# Patient Record
Sex: MALE | Race: WHITE | NOT HISPANIC OR LATINO
[De-identification: names, ages, dates, MRNs, and addresses within clinical notes are randomized per-mention and may not be internally consistent; named-entity substitution may affect disease eponyms.]

---

## 2017-02-01 ENCOUNTER — APPOINTMENT (OUTPATIENT)
Dept: BARIATRICS | Facility: CLINIC | Age: 47
End: 2017-02-01

## 2017-02-01 VITALS
BODY MASS INDEX: 32.75 KG/M2 | WEIGHT: 233.91 LBS | SYSTOLIC BLOOD PRESSURE: 123 MMHG | HEIGHT: 71 IN | HEART RATE: 75 BPM | DIASTOLIC BLOOD PRESSURE: 75 MMHG

## 2017-02-01 DIAGNOSIS — E66.9 OBESITY, UNSPECIFIED: ICD-10-CM

## 2017-09-07 ENCOUNTER — APPOINTMENT (OUTPATIENT)
Dept: BARIATRICS | Facility: CLINIC | Age: 47
End: 2017-09-07
Payer: COMMERCIAL

## 2017-09-07 ENCOUNTER — OUTPATIENT (OUTPATIENT)
Dept: OUTPATIENT SERVICES | Facility: HOSPITAL | Age: 47
LOS: 1 days | End: 2017-09-07
Payer: COMMERCIAL

## 2017-09-07 VITALS
WEIGHT: 231 LBS | DIASTOLIC BLOOD PRESSURE: 89 MMHG | HEART RATE: 74 BPM | SYSTOLIC BLOOD PRESSURE: 135 MMHG | HEIGHT: 71 IN | BODY MASS INDEX: 32.34 KG/M2

## 2017-09-07 DIAGNOSIS — Z98.89 OTHER SPECIFIED POSTPROCEDURAL STATES: Chronic | ICD-10-CM

## 2017-09-07 DIAGNOSIS — K08.409 PARTIAL LOSS OF TEETH, UNSPECIFIED CAUSE, UNSPECIFIED CLASS: Chronic | ICD-10-CM

## 2017-09-07 DIAGNOSIS — S62.509A FRACTURE OF UNSPECIFIED PHALANX OF UNSPECIFIED THUMB, INITIAL ENCOUNTER FOR CLOSED FRACTURE: Chronic | ICD-10-CM

## 2017-09-07 DIAGNOSIS — Z98.84 BARIATRIC SURGERY STATUS: ICD-10-CM

## 2017-09-07 PROCEDURE — 99214 OFFICE O/P EST MOD 30 MIN: CPT

## 2017-09-07 PROCEDURE — 74220 X-RAY XM ESOPHAGUS 1CNTRST: CPT

## 2017-09-07 PROCEDURE — 74220 X-RAY XM ESOPHAGUS 1CNTRST: CPT | Mod: 26

## 2018-06-07 ENCOUNTER — APPOINTMENT (OUTPATIENT)
Dept: BARIATRICS | Facility: CLINIC | Age: 48
End: 2018-06-07
Payer: COMMERCIAL

## 2018-06-07 VITALS
HEIGHT: 71 IN | SYSTOLIC BLOOD PRESSURE: 120 MMHG | DIASTOLIC BLOOD PRESSURE: 80 MMHG | OXYGEN SATURATION: 98 % | WEIGHT: 240.3 LBS | BODY MASS INDEX: 33.64 KG/M2 | HEART RATE: 77 BPM

## 2018-06-07 DIAGNOSIS — E78.5 HYPERLIPIDEMIA, UNSPECIFIED: ICD-10-CM

## 2018-06-07 PROCEDURE — S2083 ADJUSTMENT GASTRIC BAND: CPT

## 2018-06-07 PROCEDURE — 99214 OFFICE O/P EST MOD 30 MIN: CPT | Mod: 25

## 2018-06-07 RX ORDER — KETOCONAZOLE 20 MG/G
2 CREAM TOPICAL
Qty: 30 | Refills: 0 | Status: COMPLETED | COMMUNITY
Start: 2018-03-12

## 2018-06-07 RX ORDER — TOBRAMYCIN 3 MG/ML
0.3 SOLUTION/ DROPS OPHTHALMIC
Qty: 5 | Refills: 0 | Status: COMPLETED | COMMUNITY
Start: 2018-04-19

## 2018-08-21 ENCOUNTER — APPOINTMENT (OUTPATIENT)
Dept: CARDIOLOGY | Facility: CLINIC | Age: 48
End: 2018-08-21

## 2018-08-21 ENCOUNTER — OUTPATIENT (OUTPATIENT)
Dept: OUTPATIENT SERVICES | Facility: HOSPITAL | Age: 48
LOS: 1 days | End: 2018-08-21
Payer: COMMERCIAL

## 2018-08-21 DIAGNOSIS — R07.89 OTHER CHEST PAIN: ICD-10-CM

## 2018-08-21 DIAGNOSIS — S62.509A FRACTURE OF UNSPECIFIED PHALANX OF UNSPECIFIED THUMB, INITIAL ENCOUNTER FOR CLOSED FRACTURE: Chronic | ICD-10-CM

## 2018-08-21 DIAGNOSIS — K08.409 PARTIAL LOSS OF TEETH, UNSPECIFIED CAUSE, UNSPECIFIED CLASS: Chronic | ICD-10-CM

## 2018-08-21 DIAGNOSIS — Z98.89 OTHER SPECIFIED POSTPROCEDURAL STATES: Chronic | ICD-10-CM

## 2018-08-21 DIAGNOSIS — R07.9 CHEST PAIN, UNSPECIFIED: ICD-10-CM

## 2018-08-21 PROCEDURE — 75574 CT ANGIO HRT W/3D IMAGE: CPT

## 2018-08-21 PROCEDURE — 75574 CT ANGIO HRT W/3D IMAGE: CPT | Mod: 26

## 2018-08-30 NOTE — H&P ADULT - ASSESSMENT
This is a 48 y.o. male with strong family history of MI and PMHx of HTN, HLD, pre-diabetic who c/o chest pain/ discomfort and dizziness was found with mild LAD and RCA disease on CT coronary angiogram. Now, pt is referred to cardiac cath with possible intervention. This is a 48 y.o. male with strong family history of MI and PMHx of HTN, HLD, pre-diabetic who c/o chest pain/ discomfort and dizziness was found with mild LAD and RCA disease on CT coronary angiogram. Now, pt is referred to cardiac cath with possible intervention.   [facility] risks, benefits and alternatives discussed with patient. Risk discussed included, but not limited to MI, stroke, mortality, major bleeding, arrythmia, or infection. Consent obtained and signed educational material provided. Pt. verbalizes and understands pre-procedural instructions.

## 2018-08-30 NOTE — ASU PATIENT PROFILE, ADULT - PMH
Heel spur, left  received 2 cortisone shots 10/13  Hypertension  treated in past  Sleep apnea, obstructive  moderate

## 2018-08-30 NOTE — ASU PATIENT PROFILE, ADULT - PSH
S/P foot surgery  benign tumors removed. 1988  S/P hernia repair  right inguinal 2008, umbilical 1975  S/P tonsillectomy  1975  S/P tooth extraction  wisdom teeth  Thumb fracture  right thumb surgery due to accident,1996

## 2018-08-30 NOTE — H&P ADULT - PROBLEM SELECTOR PLAN 1
- plan for cardiac cath with possible intervention.   - risks and benefits of procedure were discussed with patient, informed consent obtained Pt verbalized understanding.

## 2018-08-30 NOTE — H&P ADULT - FAMILY HISTORY
Father  Still living? No  Family history of MI (myocardial infarction), Age at diagnosis: 51-60  Family history of hypertension, Age at diagnosis: Age Unknown     Grandparent  Still living? No  Family history of MI (myocardial infarction), Age at diagnosis: 31-40     Sibling  Still living? Unknown  Family history of obesity, Age at diagnosis: Age Unknown

## 2018-08-30 NOTE — H&P ADULT - NSHPREVIEWOFSYSTEMS_GEN_ALL_CORE
CONSTITUTIONAL: No fever, weight loss, or fatigue  EYES: No eye pain, visual disturbances, or discharge  ENMT:  No difficulty hearing, tinnitus, vertigo; No sinus or throat pain  NECK: No pain or stiffness  BREASTS: No pain, masses, or nipple discharge  RESPIRATORY: +shortness of breath  CARDIOVASCULAR: +chest pain  GASTROINTESTINAL: No abdominal or epigastric pain. No nausea, vomiting, or hematemesis; No diarrhea or constipation. No melena or hematochezia.  GENITOURINARY: No dysuria, frequency, hematuria, or incontinence  NEUROLOGICAL: No headaches, memory loss, loss of strength, numbness, or tremors  SKIN: No itching, burning, rashes, or lesions   LYMPH NODES: No enlarged glands  ENDOCRINE: No heat or cold intolerance; No hair loss  MUSCULOSKELETAL: No joint pain or swelling; No muscle, back, or extremity pain  PSYCHIATRIC: No depression, anxiety, mood swings, or difficulty sleeping  HEME/LYMPH: No easy bruising, or bleeding gums  ALLERGY AND IMMUNOLOGIC: No hives or eczema

## 2018-08-30 NOTE — H&P ADULT - HISTORY OF PRESENT ILLNESS
This is a 48 y.o. male with strong family history of MI and PMHx of HTN, HLD, pre-diabetic who c/o chest pain/ discomfort and dizziness was found with mild LAD and RCA disease on CT coronary angiogram. Now, pt is referred to cardiac cath with possible intervention.

## 2018-08-31 ENCOUNTER — OUTPATIENT (OUTPATIENT)
Dept: OUTPATIENT SERVICES | Facility: HOSPITAL | Age: 48
LOS: 1 days | Discharge: HOME | End: 2018-08-31

## 2018-08-31 VITALS
SYSTOLIC BLOOD PRESSURE: 124 MMHG | OXYGEN SATURATION: 98 % | HEART RATE: 70 BPM | RESPIRATION RATE: 14 BRPM | DIASTOLIC BLOOD PRESSURE: 72 MMHG

## 2018-08-31 VITALS
OXYGEN SATURATION: 99 % | RESPIRATION RATE: 15 BRPM | DIASTOLIC BLOOD PRESSURE: 90 MMHG | TEMPERATURE: 97 F | HEART RATE: 80 BPM | HEIGHT: 72 IN | SYSTOLIC BLOOD PRESSURE: 126 MMHG | WEIGHT: 235.01 LBS

## 2018-08-31 DIAGNOSIS — Z98.89 OTHER SPECIFIED POSTPROCEDURAL STATES: Chronic | ICD-10-CM

## 2018-08-31 DIAGNOSIS — R93.1 ABNORMAL FINDINGS ON DIAGNOSTIC IMAGING OF HEART AND CORONARY CIRCULATION: ICD-10-CM

## 2018-08-31 DIAGNOSIS — S62.509A FRACTURE OF UNSPECIFIED PHALANX OF UNSPECIFIED THUMB, INITIAL ENCOUNTER FOR CLOSED FRACTURE: Chronic | ICD-10-CM

## 2018-08-31 DIAGNOSIS — K08.409 PARTIAL LOSS OF TEETH, UNSPECIFIED CAUSE, UNSPECIFIED CLASS: Chronic | ICD-10-CM

## 2018-08-31 NOTE — PROGRESS NOTE ADULT - SUBJECTIVE AND OBJECTIVE BOX
s/p LHC revealing non obstructive CAD    Patient feels well.  Denies chest pain, shortness of breath, dizziness or palpitations at this time  A+Ox3  CV:   Respiratory:   Right groin procedure sheath pulled;  site CDI.  no bleeding, no hematoma, site soft, non tender, positive pedal pulses bilaterally      HPI:  This is a 48 y.o. male with strong family history of MI and PMHx of HTN, HLD, pre-diabetic who c/o chest pain/ discomfort and dizziness was found with mild LAD and RCA disease on CT coronary angiogram. Now, pt is referred to cardiac cath with possible intervention. (30 Aug 2018 16:03)    now s/p LHC < from: Cardiac Cath Lab - Adult (08.31.18 @ 08:06) >  Mild but non-obstructive disease in the small caliber D1 with otherwise   unremarkable LAD, Circ, Ramus and RCA arteries.  Normal LV systolic function. Estimated LV ejection fraction is 60-65 %.      < end of copied text >      --vital signs, labs, diet and activity per post procedure orders  -ambulate ad jason post bedrest  -encourage PO fluids  -plan of care discussed with patient, family and MD  -d/c after bedrest if stable  -post procedure and d/c instructions reviewed  -follow up with MD in 1-2 weeks  -Discussed therapeutic lifestyle changes to reduce risk factors such as following a cardiac diet, weight loss, maintaining a healthy weight, exercise, smoking cessation, medication compliance, and regular follow-up  with MD s/p LHC revealing non obstructive CAD    Patient feels well.  Denies chest pain, shortness of breath, dizziness or palpitations at this time  A+Ox3  CV: S1S2 reg  Respiratory: CTAB, normal effort  Right groin procedure sheath pulled;  site CDI.  no bleeding, no hematoma, site soft, non tender, positive pedal pulses bilaterally      HPI:  This is a 48 y.o. male with strong family history of MI and PMHx of HTN, HLD, pre-diabetic who c/o chest pain/ discomfort and dizziness was found with mild LAD and RCA disease on CT coronary angiogram. Now, pt is referred to cardiac cath with possible intervention. (30 Aug 2018 16:03)    now s/p LHC < from: Cardiac Cath Lab - Adult (08.31.18 @ 08:06) >  Mild but non-obstructive disease in the small caliber D1 with otherwise   unremarkable LAD, Circ, Ramus and RCA arteries.  Normal LV systolic function. Estimated LV ejection fraction is 60-65 %.      < end of copied text >      --vital signs, labs, diet and activity per post procedure orders  -ambulate ad jason post bedrest  -encourage PO fluids  -plan of care discussed with patient, family and MD  -d/c after bedrest if stable  -post procedure and d/c instructions reviewed  -follow up with MD in 1-2 weeks  -Discussed therapeutic lifestyle changes to reduce risk factors such as following a cardiac diet, weight loss, maintaining a healthy weight, exercise, smoking cessation, medication compliance, and regular follow-up  with MD

## 2018-08-31 NOTE — PACU DISCHARGE NOTE - COMMENTS
VSS, pt rt groin site D+I benign, +2pp noted, pt tolerates  oob ambulation, voiding, understands/explained all d/c instructions & need for f/u care

## 2018-09-04 DIAGNOSIS — G47.33 OBSTRUCTIVE SLEEP APNEA (ADULT) (PEDIATRIC): ICD-10-CM

## 2018-09-04 DIAGNOSIS — I20.9 ANGINA PECTORIS, UNSPECIFIED: ICD-10-CM

## 2018-09-04 DIAGNOSIS — Z88.0 ALLERGY STATUS TO PENICILLIN: ICD-10-CM

## 2018-09-04 DIAGNOSIS — I10 ESSENTIAL (PRIMARY) HYPERTENSION: ICD-10-CM

## 2018-09-04 DIAGNOSIS — Z82.49 FAMILY HISTORY OF ISCHEMIC HEART DISEASE AND OTHER DISEASES OF THE CIRCULATORY SYSTEM: ICD-10-CM

## 2018-09-04 DIAGNOSIS — E78.00 PURE HYPERCHOLESTEROLEMIA, UNSPECIFIED: ICD-10-CM

## 2018-09-04 DIAGNOSIS — E11.9 TYPE 2 DIABETES MELLITUS WITHOUT COMPLICATIONS: ICD-10-CM

## 2018-09-04 DIAGNOSIS — I25.119 ATHEROSCLEROTIC HEART DISEASE OF NATIVE CORONARY ARTERY WITH UNSPECIFIED ANGINA PECTORIS: ICD-10-CM

## 2018-12-03 NOTE — ASU PREOP CHECKLIST - NS PREOP CHK MONITOR ANESTHESIA CONSENT
Telephone Encounter by Aurora Price at 10/23/18 07:43 AM     Author:  Aurora Price Service:  (none) Author Type:  Patient      Filed:  10/23/18 07:43 AM Encounter Date:  10/22/2018 Status:  Signed     :  Aurora Price (Patient )            Appt scheduled on 11/15.  Msg stated 10/15. Apologized to pt for error.[KL1.1M]      Revision History        User Key Date/Time User Provider Type Action    > KL1.1 10/23/18 07:43 AM Aurora Price Patient  Sign    M - Manual             done

## 2019-02-06 ENCOUNTER — OUTPATIENT (OUTPATIENT)
Dept: OUTPATIENT SERVICES | Facility: HOSPITAL | Age: 49
LOS: 1 days | End: 2019-02-06
Payer: COMMERCIAL

## 2019-02-06 ENCOUNTER — APPOINTMENT (OUTPATIENT)
Dept: BARIATRICS | Facility: CLINIC | Age: 49
End: 2019-02-06
Payer: COMMERCIAL

## 2019-02-06 VITALS
BODY MASS INDEX: 33.33 KG/M2 | OXYGEN SATURATION: 97 % | WEIGHT: 238.1 LBS | SYSTOLIC BLOOD PRESSURE: 120 MMHG | HEIGHT: 71 IN | DIASTOLIC BLOOD PRESSURE: 80 MMHG | HEART RATE: 80 BPM

## 2019-02-06 DIAGNOSIS — E78.5 HYPERLIPIDEMIA, UNSPECIFIED: ICD-10-CM

## 2019-02-06 DIAGNOSIS — E66.9 OBESITY, UNSPECIFIED: ICD-10-CM

## 2019-02-06 DIAGNOSIS — Z98.89 OTHER SPECIFIED POSTPROCEDURAL STATES: Chronic | ICD-10-CM

## 2019-02-06 DIAGNOSIS — S62.509A FRACTURE OF UNSPECIFIED PHALANX OF UNSPECIFIED THUMB, INITIAL ENCOUNTER FOR CLOSED FRACTURE: Chronic | ICD-10-CM

## 2019-02-06 DIAGNOSIS — K08.409 PARTIAL LOSS OF TEETH, UNSPECIFIED CAUSE, UNSPECIFIED CLASS: Chronic | ICD-10-CM

## 2019-02-06 DIAGNOSIS — Z98.84 BARIATRIC SURGERY STATUS: ICD-10-CM

## 2019-02-06 PROCEDURE — 99213 OFFICE O/P EST LOW 20 MIN: CPT | Mod: 25

## 2019-02-06 PROCEDURE — 74220 X-RAY XM ESOPHAGUS 1CNTRST: CPT | Mod: 26

## 2019-02-06 PROCEDURE — S2083 ADJUSTMENT GASTRIC BAND: CPT

## 2019-02-06 PROCEDURE — 74220 X-RAY XM ESOPHAGUS 1CNTRST: CPT

## 2019-02-06 NOTE — CONSULT LETTER
[Dear  ___] : Dear  [unfilled], [Courtesy Letter:] : I had the pleasure of seeing your patient, [unfilled], in my office today. [Please see my note below.] : Please see my note below. [Consult Closing:] : Thank you very much for allowing me to participate in the care of this patient.  If you have any questions, please do not hesitate to contact me. [Sincerely,] : Sincerely, [FreeTextEntry3] : Ronaldo Campso MD, FACS, FASMBS\par Chair, Dept of Surgery, [facility]\par [facility]\par [facility]\par [facility]\par [address]\[place] [address]\par P [phone]\par F [phone]\par \par

## 2019-02-06 NOTE — DATA REVIEWED
[FreeTextEntry1] : Video esophagram reviewed demonstrating mild dilation of the distal esophagus and gastric pouch. The lap band appears to be in appropriate orientation without evidence of prolapse the aperture of which is quite narrowed. There is moderate reflux seen during swallows.

## 2019-02-06 NOTE — REASON FOR VISIT
[Follow-Up Visit] : a follow-up visit for [Morbid Obesity (BMI<40)] : morbid obesity (bmi<40) [S/P Bariatric Surgery] : s/p bariatric surgery [Band Adjustment] : band adjustment

## 2019-02-06 NOTE — PHYSICAL EXAM
[Normal] : affect appropriate [de-identified] : Soft, nontender, non distended.  Positive bowel sounds.  No evidence of hernia or masses.  Lap Band port easily palpable without tenderness

## 2019-02-06 NOTE — HISTORY OF PRESENT ILLNESS
[de-identified] : hx Lap Band + Plication.  Returns today with frequent nocturnal reflux and occasional need to force himself to vomit after initiating a meal. [Procedure: ___] : Procedure performed: [unfilled]  [Date of Surgery: ___] : Date of Surgery:   [unfilled] [Surgeon Name:   ___] : Surgeon Name: Dr. DEE

## 2019-02-06 NOTE — PROCEDURE
[FreeTextEntry1] : LAP-BAND adjustment was performed today. Using standard sterile technique, a 20-gauge Ma needle was inserted into the LAP-BAND port.  This was accessed with single puncture.  5 cc were aspirated and -1 cc removed. The resulting net volume remains 4 cc.\par \par The patient was seated erect and tolerated water test without symptoms of reflux, dysphagia or regurgitation.\par \par Routine post adjustment nutritional counseling was provided. This patient should adhere to a liquid diet for the next 24-48 hours advancing one stage per day to regular as tolerated.

## 2019-08-08 ENCOUNTER — APPOINTMENT (OUTPATIENT)
Dept: BARIATRICS | Facility: CLINIC | Age: 49
End: 2019-08-08

## 2019-10-02 ENCOUNTER — APPOINTMENT (OUTPATIENT)
Dept: BARIATRICS | Facility: CLINIC | Age: 49
End: 2019-10-02
Payer: COMMERCIAL

## 2019-10-02 VITALS
WEIGHT: 247.57 LBS | DIASTOLIC BLOOD PRESSURE: 79 MMHG | OXYGEN SATURATION: 96 % | SYSTOLIC BLOOD PRESSURE: 125 MMHG | HEART RATE: 77 BPM | HEIGHT: 71 IN | BODY MASS INDEX: 34.66 KG/M2

## 2019-10-02 DIAGNOSIS — Z87.19 PERSONAL HISTORY OF OTHER DISEASES OF THE DIGESTIVE SYSTEM: ICD-10-CM

## 2019-10-02 DIAGNOSIS — Z98.84 BARIATRIC SURGERY STATUS: ICD-10-CM

## 2019-10-02 DIAGNOSIS — E66.9 OBESITY, UNSPECIFIED: ICD-10-CM

## 2019-10-02 DIAGNOSIS — R63.5 ABNORMAL WEIGHT GAIN: ICD-10-CM

## 2019-10-02 PROCEDURE — 99213 OFFICE O/P EST LOW 20 MIN: CPT

## 2019-10-02 NOTE — PHYSICAL EXAM
[Normal] : affect appropriate [de-identified] : Soft, nontender, non distended.  Positive bowel sounds.  No evidence of hernia or masses.  Lap Band port easily palpable without tenderness

## 2019-10-02 NOTE — HISTORY OF PRESENT ILLNESS
[de-identified] : ROutine Lap band follow up.  Scheduled to undergo [place] surgery in upcoming weeks at [facility].\par NO complaints with respect to his band.  Feels adequate restriction but is eating too quickly and occasionally brings back up food. [Procedure: ___] : Procedure performed: [unfilled]  [Date of Surgery: ___] : Date of Surgery:   [unfilled] [Surgeon Name:   ___] : Surgeon Name: Dr. DEE

## 2020-03-04 ENCOUNTER — APPOINTMENT (OUTPATIENT)
Dept: BARIATRICS | Facility: CLINIC | Age: 50
End: 2020-03-04

## 2021-02-25 ENCOUNTER — OUTPATIENT (OUTPATIENT)
Dept: OUTPATIENT SERVICES | Facility: HOSPITAL | Age: 51
LOS: 1 days | End: 2021-02-25
Payer: COMMERCIAL

## 2021-02-25 ENCOUNTER — APPOINTMENT (OUTPATIENT)
Dept: BARIATRICS | Facility: CLINIC | Age: 51
End: 2021-02-25
Payer: COMMERCIAL

## 2021-02-25 ENCOUNTER — RESULT REVIEW (OUTPATIENT)
Age: 51
End: 2021-02-25

## 2021-02-25 VITALS
WEIGHT: 255.73 LBS | TEMPERATURE: 97.3 F | HEIGHT: 71 IN | BODY MASS INDEX: 35.8 KG/M2 | HEART RATE: 78 BPM | SYSTOLIC BLOOD PRESSURE: 143 MMHG | DIASTOLIC BLOOD PRESSURE: 82 MMHG | OXYGEN SATURATION: 96 %

## 2021-02-25 DIAGNOSIS — Z98.89 OTHER SPECIFIED POSTPROCEDURAL STATES: Chronic | ICD-10-CM

## 2021-02-25 DIAGNOSIS — S62.509A FRACTURE OF UNSPECIFIED PHALANX OF UNSPECIFIED THUMB, INITIAL ENCOUNTER FOR CLOSED FRACTURE: Chronic | ICD-10-CM

## 2021-02-25 DIAGNOSIS — K08.409 PARTIAL LOSS OF TEETH, UNSPECIFIED CAUSE, UNSPECIFIED CLASS: Chronic | ICD-10-CM

## 2021-02-25 DIAGNOSIS — Z98.84 BARIATRIC SURGERY STATUS: ICD-10-CM

## 2021-02-25 PROCEDURE — 99072 ADDL SUPL MATRL&STAF TM PHE: CPT

## 2021-02-25 PROCEDURE — 99213 OFFICE O/P EST LOW 20 MIN: CPT

## 2021-02-25 PROCEDURE — 74220 X-RAY XM ESOPHAGUS 1CNTRST: CPT

## 2021-02-25 PROCEDURE — 74220 X-RAY XM ESOPHAGUS 1CNTRST: CPT | Mod: 26

## 2021-02-25 NOTE — HISTORY OF PRESENT ILLNESS
[Procedure: ___] : Procedure performed: [unfilled]  [Date of Surgery: ___] : Date of Surgery:   [unfilled] [Surgeon Name:   ___] : Surgeon Name: Dr. DEE

## 2021-02-27 NOTE — ASSESSMENT
[FreeTextEntry1] : F/u Band visit.  Last seen in OCt 2019.  Gained 8 lbs.\par Symptoms only when eating too fast.  Has fallen back into old eating habits, lack of portion control, louise.\par Has begun "juicing" (primarily vegetables, "green" drink)\par \par VE personally reviewed today.  Findings discussed with the patient.  No evidence of prolapse or obstruction.  \par \par Re-educated regarding band mechanics and dietary strategy.\par Nutritional counseling has been provided. The patient is encouraged to remain calorie conscious and continue a low fat, low carbohydrate, high protein diet. Also, emphasis has been placed on the importance of adequate hydration, multi-vitamin supplementation and exercise.  (15 min)\par Encouraged to maintain food diary and consider Bariatric Nutritionist follow up for tailoring of diet.\par \par f/u 6 months.

## 2021-02-27 NOTE — PHYSICAL EXAM
[Normal] : affect appropriate [de-identified] : Soft, nontender, non distended.  Positive bowel sounds.  No evidence of hernia or masses.  Lap Band port easily palpable without tenderness

## 2021-09-30 ENCOUNTER — APPOINTMENT (OUTPATIENT)
Dept: BARIATRICS | Facility: CLINIC | Age: 51
End: 2021-09-30

## 2021-10-26 ENCOUNTER — EMERGENCY (EMERGENCY)
Facility: HOSPITAL | Age: 51
LOS: 0 days | Discharge: HOME | End: 2021-10-26
Attending: EMERGENCY MEDICINE
Payer: COMMERCIAL

## 2021-10-26 VITALS — HEIGHT: 72 IN | WEIGHT: 240.08 LBS

## 2021-10-26 VITALS — SYSTOLIC BLOOD PRESSURE: 126 MMHG | DIASTOLIC BLOOD PRESSURE: 88 MMHG

## 2021-10-26 DIAGNOSIS — Z98.89 OTHER SPECIFIED POSTPROCEDURAL STATES: Chronic | ICD-10-CM
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
## 2021-10-26 DIAGNOSIS — Z88.0 ALLERGY STATUS TO PENICILLIN: ICD-10-CM

## 2021-10-26 DIAGNOSIS — M75.32 CALCIFIC TENDINITIS OF LEFT SHOULDER: ICD-10-CM

## 2021-10-26 DIAGNOSIS — I10 ESSENTIAL (PRIMARY) HYPERTENSION: ICD-10-CM

## 2021-10-26 DIAGNOSIS — K08.409 PARTIAL LOSS OF TEETH, UNSPECIFIED CAUSE, UNSPECIFIED CLASS: Chronic | ICD-10-CM

## 2021-10-26 DIAGNOSIS — Z79.82 LONG TERM (CURRENT) USE OF ASPIRIN: ICD-10-CM

## 2021-10-26 DIAGNOSIS — M25.512 PAIN IN LEFT SHOULDER: ICD-10-CM
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
## 2021-10-26 DIAGNOSIS — S62.509A FRACTURE OF UNSPECIFIED PHALANX OF UNSPECIFIED THUMB, INITIAL ENCOUNTER FOR CLOSED FRACTURE: Chronic | ICD-10-CM

## 2021-10-26 DIAGNOSIS — G47.33 OBSTRUCTIVE SLEEP APNEA (ADULT) (PEDIATRIC): ICD-10-CM

## 2021-10-26 PROCEDURE — 73030 X-RAY EXAM OF SHOULDER: CPT | Mod: 26,LT

## 2021-10-26 PROCEDURE — 99284 EMERGENCY DEPT VISIT MOD MDM: CPT

## 2021-10-26 PROCEDURE — 73030 X-RAY EXAM OF SHOULDER: CPT | Mod: LT

## 2021-10-26 PROCEDURE — 99283 EMERGENCY DEPT VISIT LOW MDM: CPT | Mod: 25

## 2021-10-26 RX ORDER — DEXAMETHASONE 0.5 MG/5ML
10 ELIXIR ORAL ONCE
Refills: 0 | Status: COMPLETED | OUTPATIENT
Start: 2021-10-26 | End: 2021-10-26

## 2021-10-26 RX ADMIN — Medication 10 MILLIGRAM(S): at 17:13

## 2021-10-26 NOTE — ED STATDOCS - NSFOLLOWUPINSTRUCTIONS_ED_ALL_ED_FT
Calcific Tendinitis    WHAT YOU NEED TO KNOW:    Calcific tendinitis is a condition that occurs when calcium collects in the tendons of the shoulder. Tendons are bands of tissue that connect muscle to bone. The calcium can make the tendons swell and cause severe pain.    DISCHARGE INSTRUCTIONS:    Medicines: You may need any of the following:   •NSAIDs may decrease swelling and pain. This medicine can be bought with or without a doctor's order. This medicine can cause stomach bleeding or kidney problems in certain people. If you take blood thinner medicine, always ask your healthcare provider if NSAIDs are safe for you. Always read the medicine label and follow the directions on it before using this medicine.      •Steroids help decrease inflammation.      •Take your medicine as directed. Contact your healthcare provider if you think your medicine is not helping or if you have side effects. Tell him or her if you are allergic to any medicine. Keep a list of the medicines, vitamins, and herbs you take. Include the amounts, and when and why you take them. Bring the list or the pill bottles to follow-up visits. Carry your medicine list with you in case of an emergency.      Go to physical therapy: A physical therapist can teach you exercises to help improve movement and strength, and to decrease pain.    Apply ice: Apply ice on your shoulder for 15 to 20 minutes every hour or as directed. Use an ice pack, or put crushed ice in a plastic bag. Cover it with a towel. Ice helps prevent tissue damage and decreases swelling and pain.    Apply heat: Apply heat on your shoulder for 20 to 30 minutes every 2 hours for as many days as directed. Heat helps decrease pain and muscle spasms.    Rest your arm: Healthcare providers may have you place an item, such as a ball, between your side and elbow while you rest. This may help decrease stiffness and pain.    Follow up with your healthcare provider as directed: Bring a list of any questions you have so you remember to ask them during your visits.    Contact your healthcare provider if:   •You have worse pain and stiffness in your shoulder.      •You have new or more trouble moving your arm.      •You have questions or concerns about your condition or care.      Return to the emergency department if:   •You cannot move your arm.      •You have severe pain in your arm or shoulder.         © Copyright [employer] 2021

## 2021-10-26 NOTE — ED ADULT NURSE NOTE - CAS DISCH TRANSFER METHOD
Returned call to patients grandmother. Patients name and  verified. Grandmother inquired about paperwork for Jenaro Hinson. Advised form was completed and mailed back to them (original scanned into grandmothers chart). Grandmother is requesting order for ST to be sent to [facility]. Advised I would send a message to provider. Private car

## 2021-10-26 NOTE — ED STATDOCS - NSICDXFAMILYHX_GEN_ALL_CORE_FT
FAMILY HISTORY:  Father  Still living? No  Family history of hypertension, Age at diagnosis: Age Unknown  Family history of MI (myocardial infarction), Age at diagnosis: 51-60    Sibling  Still living? Unknown  Family history of obesity, Age at diagnosis: Age Unknown    Grandparent  Still living? No  Family history of MI (myocardial infarction), Age at diagnosis: 31-40

## 2021-10-26 NOTE — ED STATDOCS - NSICDXPASTMEDICALHX_GEN_ALL_CORE_FT
PAST MEDICAL HISTORY:  Heel spur, left received 2 cortisone shots 10/13    Hypertension treated in past    Sleep apnea, obstructive moderate

## 2021-10-26 NOTE — ED STATDOCS - NSICDXPASTSURGICALHX_GEN_ALL_CORE_FT
PAST SURGICAL HISTORY:  S/P foot surgery benign tumors removed. 1988    S/P hernia repair right inguinal 2008, umbilical 1975    S/P tonsillectomy 1975    S/P tooth extraction wisdom teeth    Thumb fracture right thumb surgery due to accident,1996

## 2021-10-26 NOTE — ED STATDOCS - PROGRESS NOTE DETAILS
50 y/o M with no PMH presents with atraumatic left shoulder pain. States pain has been getting gradually worse over the past 3-4 months. States 2 days ago he attempted to reach and open a car door reporting "I saw stars the pain was so bad." Points to anterior shoulder as location of pain. Has orthopedic follow up tomorrow. No change in pain with motrin, mild improvement with percocet at home. PE: Well appearing. Cardiac: s1s2, RRR. lungs: CTAB. MSK; No obvious deformity to shoulder. +TTP left anterior shoulder. Limited ROm in left shoulder 2/2 pain. 5/5 UE strength. Sensation intact to light touch in upper extremities. A/P: Fracture unlikeky, will check XR, provide decadron, likely dc home. - Marcio Zuniga PA-C

## 2021-10-26 NOTE — ED STATDOCS - ATTENDING CONTRIBUTION TO CARE
I, Indra Roger MD,  performed the initial face to face bedside interview with this patient regarding history of present illness, review of symptoms and relevant past medical, social and family history.  I completed an independent physical examination.  I was the initial provider who evaluated this patient. I have signed out the follow up of any pending tests (i.e. labs, radiological studies) to the ACP.  I have communicated the patient’s plan of care and disposition with the ACP.

## 2021-10-26 NOTE — ED STATDOCS - OBJECTIVE STATEMENT
50 yo pt presents to ED for left shoulder pain.  pt states he has had pain left shoulder starting over summer.  Pain has become worse since 10/24/21.  Pt states that the handle to door is broken and he has to twist left arm/shoulder to open door as possible aggravating factor.  Denies falls, no fever.  Pt has been taking Motrin and took percocet with some relief.

## 2021-10-26 NOTE — ED STATDOCS - CROS ED CONS ALL NEG
Report received. Pt denies any complaints. POC discussed with pt, pt verbalizes understanding. Call light within reach. negative...

## 2021-10-26 NOTE — ED STATDOCS - PATIENT PORTAL LINK FT
You can access the FollowMyHealth Patient Portal offered by [facility] by registering at the following website: [email or web address]. By joining [employer]’s FollowMyHealth portal, you will also be able to view your health information using other applications (apps) compatible with our system.

## 2023-04-03 ENCOUNTER — APPOINTMENT (OUTPATIENT)
Dept: UROLOGY | Facility: CLINIC | Age: 53
End: 2023-04-03

## 2023-04-26 NOTE — H&P ADULT - DOES THIS PATIENT HAVE A HISTORY OF OR HAS BEEN DX WITH HEART FAILURE?
You should take Bactrim DS (antibiotic) twice daily for 3 days for treatment of urinary tract infection  After completing the course of Bactrim, you should take Diflucan (fluconazole) as a single dose, then repeat the dose again in 48 hours  no

## 2023-05-05 ENCOUNTER — APPOINTMENT (OUTPATIENT)
Dept: VASCULAR SURGERY | Facility: CLINIC | Age: 53
End: 2023-05-05
Payer: COMMERCIAL

## 2023-05-05 VITALS — SYSTOLIC BLOOD PRESSURE: 170 MMHG | DIASTOLIC BLOOD PRESSURE: 94 MMHG | HEART RATE: 72 BPM

## 2023-05-05 DIAGNOSIS — I82.90 ACUTE EMBOLISM AND THROMBOSIS OF UNSPECIFIED VEIN: ICD-10-CM

## 2023-05-05 PROCEDURE — 99204 OFFICE O/P NEW MOD 45 MIN: CPT

## 2023-05-05 PROCEDURE — 93971 EXTREMITY STUDY: CPT | Mod: LT

## 2023-05-05 RX ORDER — RIVAROXABAN 20 MG/1
20 TABLET, FILM COATED ORAL
Refills: 0 | Status: ACTIVE | COMMUNITY

## 2023-05-05 RX ORDER — ROSUVASTATIN CALCIUM 5 MG/1
TABLET, FILM COATED ORAL
Refills: 0 | Status: ACTIVE | COMMUNITY

## 2023-05-05 RX ORDER — CYANOCOBALAMIN/COBAMAMIDE 5K-100 MCG
150 LOZENGE SUBLINGUAL
Refills: 0 | Status: ACTIVE | COMMUNITY

## 2023-05-05 NOTE — HISTORY OF PRESENT ILLNESS
[FreeTextEntry1] : 52-year-old white male with past medical history significant for borderline hypertension, coronary artery disease, carotid artery disease, hypercholesterolemia, former obesity, status post bariatric surgery in 2014, presents for evaluation of a left upper extremity localized area of a basilic venous thrombosis.  Patent noted discomfort in the left dorsal proximal forearm for the last 8 months but then approximately 3 months ago was evaluated and had an ultrasound that demonstrated a small localized area of basilic venous thrombosis.  Patient denies edema associated with this issue but does note discomfort upon touching the area and a type of burning sensation.  For the last 3 months he has been anticoagulated with Xarelto 20 mg daily.  He denies prior history of deep or superficial thrombophlebitis anywhere else in his body.  He denies episode of edema of either this arm or hand or anywhere else in his body.

## 2023-05-05 NOTE — PHYSICAL EXAM
[Normal Thyroid] : the thyroid was normal [Normal Breath Sounds] : Normal breath sounds [Normal Heart Sounds] : normal heart sounds [Normal Rate and Rhythm] : normal rate and rhythm [Alert] : alert [Oriented to Person] : oriented to person [Oriented to Place] : oriented to place [Oriented to Time] : oriented to time [JVD] : no jugular venous distention  [Carotid Bruits] : no carotid bruits [Abdomen Masses] : No abdominal masses [Abdomen Tenderness] : ~T ~M No abdominal tenderness [de-identified] : well nourished [de-identified] : HEENT, PERRLA, EOMi, sclerae anicteric, conjunctivae pink and moist [FreeTextEntry1] : 2-2+ femoral, popliteal and posterior tibial pulses bilaterally; both feet pink, warm, well-perfused without ulcers, cyanosis, or gangrenous changes; no edema of either lower extremity; no phlebitic segments, Homans' sign, or calf tenderness\par \par Small area on the proximal left dorsal arm tenderness but no gross phlebitic segment; 2+ left radial pulse; all fingers of the left hand intact without any evidence of arterial or venous compromise

## 2023-05-05 NOTE — ASSESSMENT
[FreeTextEntry1] : 52-year-old with small area L basilic venous thrombosis but no other findings to suggest a significant venous problem.  The patient underwent a venous duplex study today and this demonstrated no evidence of deep vein thrombosis but probable lymph node.\par \par I noted my findings to the patient and suggested he could discontinue his Xarelto as of today.\par \par In addition, I suggested that he should speak to his either dermatologist or another soft tissue specialist about removal of the lymph node.\par \par I have no further suggestions and will see him again on an as-needed basis.\par \par All questions were answered.

## 2023-05-08 ENCOUNTER — APPOINTMENT (OUTPATIENT)
Dept: UROLOGY | Facility: CLINIC | Age: 53
End: 2023-05-08
Payer: COMMERCIAL

## 2023-05-08 VITALS
DIASTOLIC BLOOD PRESSURE: 77 MMHG | SYSTOLIC BLOOD PRESSURE: 136 MMHG | HEIGHT: 72 IN | WEIGHT: 248 LBS | HEART RATE: 73 BPM | BODY MASS INDEX: 33.59 KG/M2

## 2023-05-08 DIAGNOSIS — R35.1 NOCTURIA: ICD-10-CM

## 2023-05-08 PROCEDURE — 99213 OFFICE O/P EST LOW 20 MIN: CPT

## 2023-05-08 NOTE — ASSESSMENT
[FreeTextEntry1] : 52 year old M  with nocturia. For nocturia, we discussed that nocturia can be the result of nocturnal polyuria, global polyuria, or diminished bladder capacity either globally or nocturnally. The underlying cause of nocturia can be difficult to discern but include conditions that cause lower extremity edema such as CHF, venous insufficiency. Other causes include CHF, Diabetes mellitus, diabetes insipidus, and others. If any of these conditions are present, they should be addressed before urologic treatments are tried. Nocturia can be caused or exacerbated by BPH or OAB. If these are present, they can be treated. OAB and BPH treatments have been shown to only suboptimally treat nocturia, but should be attempted. We discussed that the best initial tool in the work up for nocturia is a voiding diary. Depending on the results of the diary, treatment options include medications for OAB such as anticholinergics or beta agonists, medications for BPH, behavioral modifications such as stopping fluid intake after 6 PM, or desmopressin to reduce nocturnal polyuria. \par Recommended pt comply with CPAP, as this is most likely cause of nocturia. Discussed FVC, but pt is not bothered by sx so will observe for now.

## 2023-05-08 NOTE — HISTORY OF PRESENT ILLNESS
[FreeTextEntry1] : 52 year old man seen 05/08/2023 with complaint of nocturia 3-4x/night. He denies daytime symptoms. He says he drinks a lot of water so does go frequently, but appropriate for his intake. No urgency, no incontinence. He does admit to dx of CRISTELA, does not wear CPAP. \par No hematuria, no dysuria, no frequency, no urgency, no hesitancy, no straining. No incontinence. \par No fevers, no chills, no nausea, no vomiting, no flank pain. \par PVR 48 mL

## 2023-05-09 LAB
APPEARANCE: CLEAR
BACTERIA: NEGATIVE /HPF
BILIRUBIN URINE: NEGATIVE
BLOOD URINE: ABNORMAL
CALCIUM OXALATE CRYSTALS: PRESENT
CAST: 0 /LPF
COLOR: YELLOW
EPITHELIAL CELLS: 0 /HPF
GLUCOSE QUALITATIVE U: NEGATIVE MG/DL
KETONES URINE: NEGATIVE MG/DL
LEUKOCYTE ESTERASE URINE: NEGATIVE
MICROSCOPIC-UA: NORMAL
NITRITE URINE: NEGATIVE
PH URINE: 5.5
PROTEIN URINE: NEGATIVE MG/DL
RED BLOOD CELLS URINE: 4 /HPF
REVIEW: NORMAL
SPECIFIC GRAVITY URINE: 1.03
UROBILINOGEN URINE: 1 MG/DL
WHITE BLOOD CELLS URINE: 0 /HPF

## 2023-05-10 LAB — BACTERIA UR CULT: NORMAL

## 2023-06-16 ENCOUNTER — APPOINTMENT (OUTPATIENT)
Dept: VASCULAR SURGERY | Facility: CLINIC | Age: 53
End: 2023-06-16

## 2023-11-06 ENCOUNTER — APPOINTMENT (OUTPATIENT)
Dept: ORTHOPEDIC SURGERY | Facility: CLINIC | Age: 53
End: 2023-11-06
Payer: COMMERCIAL

## 2023-11-06 VITALS — WEIGHT: 248 LBS | HEIGHT: 72 IN | BODY MASS INDEX: 33.59 KG/M2

## 2023-11-06 DIAGNOSIS — M16.12 UNILATERAL PRIMARY OSTEOARTHRITIS, LEFT HIP: ICD-10-CM

## 2023-11-06 DIAGNOSIS — Z87.19 PERSONAL HISTORY OF OTHER DISEASES OF THE DIGESTIVE SYSTEM: ICD-10-CM

## 2023-11-06 DIAGNOSIS — M16.9 OSTEOARTHRITIS OF HIP, UNSPECIFIED: ICD-10-CM

## 2023-11-06 DIAGNOSIS — S83.91XS SPRAIN OF UNSPECIFIED SITE OF RIGHT KNEE, SEQUELA: ICD-10-CM

## 2023-11-06 DIAGNOSIS — M16.0 BILATERAL PRIMARY OSTEOARTHRITIS OF HIP: ICD-10-CM

## 2023-11-06 DIAGNOSIS — M16.11 UNILATERAL PRIMARY OSTEOARTHRITIS, RIGHT HIP: ICD-10-CM

## 2023-11-06 DIAGNOSIS — S83.92XS SPRAIN OF UNSPECIFIED SITE OF LEFT KNEE, SEQUELA: ICD-10-CM

## 2023-11-06 PROCEDURE — 73521 X-RAY EXAM HIPS BI 2 VIEWS: CPT

## 2023-11-06 PROCEDURE — 99204 OFFICE O/P NEW MOD 45 MIN: CPT

## 2023-11-06 PROCEDURE — 73564 X-RAY EXAM KNEE 4 OR MORE: CPT | Mod: 50

## 2023-11-06 PROCEDURE — 73502 X-RAY EXAM HIP UNI 2-3 VIEWS: CPT

## 2024-10-02 NOTE — ED STATDOCS - RESPIRATORY NEGATIVE STATEMENT, MLM
Problem: Physical Therapy - Adult  Goal: By Discharge: Performs mobility at highest level of function for planned discharge setting.  See evaluation for individualized goals.  Outcome: Adequate for Discharge      no chest pain, no cough, and no shortness of breath.

## 2024-11-27 NOTE — ASSESSMENT
Assessment: patient seen for follow up. chart reviewed. hospital course noted  81y old  Male who presents with a chief complaint of Urosepsis nephrostomy tube replaced  patient seen breakfast tray well taken. patient states he is hungry and eats the food even if he doesn't usually like. wants pancake every morning. dislikes yogurt. taking ensure max as ordered  11/23 last BM recorded noted on bowel regimen  patient states no recent BM        Factors impacting intake: [ ] none [ ] nausea  [ ] vomiting [ ] diarrhea [ ] constipation  [ ]chewing problems [ ] swallowing issues  [ ] other:     Diet Prescription: Diet, Regular:   Supplement Feeding Modality:  Oral  Ensure Max Cans or Servings Per Day:  1       Frequency:  Three Times a day (11-21-24 @ 10:34)    Intake: up to 100% per flow sheets    Current Weight: transfer papers 252# 11/26 220.6# fluctuating weights this admit      Pertinent Medications: MEDICATIONS  (STANDING):  apixaban 5 milliGRAM(s) Oral every 12 hours  ceftolozane/tazobactam IVPB 1500 milliGRAM(s) IV Intermittent every 8 hours  chlorhexidine 2% Cloths 1 Application(s) Topical <User Schedule>  finasteride 5 milliGRAM(s) Oral daily  hydrocortisone sodium succinate Injectable 25 milliGRAM(s) IV Push daily  metoprolol succinate ER 12.5 milliGRAM(s) Oral every 12 hours  pantoprazole    Tablet 40 milliGRAM(s) Oral before breakfast  polyethylene glycol 3350 17 Gram(s) Oral daily  risperiDONE   Tablet 0.25 milliGRAM(s) Oral two times a day  senna 1 Tablet(s) Oral with breakfast  tamsulosin 0.4 milliGRAM(s) Oral at bedtime    MEDICATIONS  (PRN):  acetaminophen     Tablet .. 650 milliGRAM(s) Oral every 6 hours PRN Temp greater or equal to 38C (100.4F), Mild Pain (1 - 3)  ondansetron Injectable 4 milliGRAM(s) IV Push every 8 hours PRN Nausea and/or Vomiting  sodium chloride 3%  Inhalation 4 milliLiter(s) Inhalation every 8 hours PRN sputum induction    Pertinent Labs: K 3.2 BUN 24   Skin: buttock stage 2 right heel stage 1 left buttock DTI sacrum 1 left heel stage 1    Estimated Needs:   [x ] no change since previous assessment based on # 64.4kg 25-30kcals/kg 1610-1932kcals and 1.2-1.4 gms protein /kg 77-90 gms protein  [ ] recalculated:     Previous Nutrition Diagnosis:   [ ] Inadequate Energy Intake [ ]Inadequate Oral Intake [ ] Excessive Energy Intake   [ ] Underweight [x ] Increased Nutrient Needs [x ] Overweight/Obesity   [ ] Altered GI Function [ ] Unintended Weight Loss [ ] Food & Nutrition Related Knowledge Deficit [ ] Malnutrition     Nutrition Diagnosis is [ x] ongoing  [ ] resolved [ ] not applicable     New Nutrition Diagnosis: [ x] not applicable       Interventions:   Recommend  [ ] Change Diet To:  [x ] Nutrition Supplement continue ensure max TID  [ ] Nutrition Support  [ x] Other: recommend MVI an Vit C 500mg BID, follow for BM adjust bowel regimen as appropriate , trend weights    Monitoring and Evaluation:   [ x] PO intake [ x ] Tolerance to diet prescription [ x ] weights [ x ] labs[ x ] follow up per protocol  [ ] other: [FreeTextEntry1] : 9 lb weight gain.  Decreased activity secondary to shoulder problems.  Scheduled for shoulder surgery on 10/17 at [facility].  \par \par EGD done in February was reviewed by me and unremarkable.  No evidence of prolapse, obstruction or erosion.\par \par TOlerating regular diet but eating too quickly which often leaves him regurgitating and remaining hungry which results in grazing behaviors.\par No adjustment indicated.\par \par Nutritional counseling has been provided. The patient is encouraged to remain calorie conscious and continue a low fat, low carbohydrate, high protein diet. Also, emphasis has been placed on the importance of adequate hydration, multi-vitamin supplementation and exercise.  (15 min)\par \par f/u in 6 months. Assessment: patient seen for follow up. chart reviewed. hospital course noted  81y old  Male who presents with a chief complaint of Urosepsis nephrostomy tube replaced  patient seen breakfast tray well taken. patient states he is hungry and eats the food even if he doesn't usually like. wants pancake every morning. dislikes yogurt. taking ensure max as ordered  11/23 last BM recorded noted on bowel regimen  patient states no recent BM  of note patient per transfer papers was on mechanical soft diet PTA, tolerating solid foods this admit consider speech evaluation per MD discretion         Factors impacting intake: [ ] none [ ] nausea  [ ] vomiting [ ] diarrhea [ ] constipation  [ ]chewing problems [ ] swallowing issues  [ ] other:     Diet Prescription: Diet, Regular:   Supplement Feeding Modality:  Oral  Ensure Max Cans or Servings Per Day:  1       Frequency:  Three Times a day (11-21-24 @ 10:34)    Intake: up to 100% per flow sheets    Current Weight: transfer papers 252# 11/26 220.6# fluctuating weights this admit      Pertinent Medications: MEDICATIONS  (STANDING):  apixaban 5 milliGRAM(s) Oral every 12 hours  ceftolozane/tazobactam IVPB 1500 milliGRAM(s) IV Intermittent every 8 hours  chlorhexidine 2% Cloths 1 Application(s) Topical <User Schedule>  finasteride 5 milliGRAM(s) Oral daily  hydrocortisone sodium succinate Injectable 25 milliGRAM(s) IV Push daily  metoprolol succinate ER 12.5 milliGRAM(s) Oral every 12 hours  pantoprazole    Tablet 40 milliGRAM(s) Oral before breakfast  polyethylene glycol 3350 17 Gram(s) Oral daily  risperiDONE   Tablet 0.25 milliGRAM(s) Oral two times a day  senna 1 Tablet(s) Oral with breakfast  tamsulosin 0.4 milliGRAM(s) Oral at bedtime    MEDICATIONS  (PRN):  acetaminophen     Tablet .. 650 milliGRAM(s) Oral every 6 hours PRN Temp greater or equal to 38C (100.4F), Mild Pain (1 - 3)  ondansetron Injectable 4 milliGRAM(s) IV Push every 8 hours PRN Nausea and/or Vomiting  sodium chloride 3%  Inhalation 4 milliLiter(s) Inhalation every 8 hours PRN sputum induction    Pertinent Labs: K 3.2 BUN 24   Skin: buttock stage 2 right heel stage 1 left buttock DTI sacrum 1 left heel stage 1    Estimated Needs:   [x ] no change since previous assessment based on # 64.4kg 25-30kcals/kg 1610-1932kcals and 1.2-1.4 gms protein /kg 77-90 gms protein  [ ] recalculated:     Previous Nutrition Diagnosis:   [ ] Inadequate Energy Intake [ ]Inadequate Oral Intake [ ] Excessive Energy Intake   [ ] Underweight [x ] Increased Nutrient Needs [x ] Overweight/Obesity   [ ] Altered GI Function [ ] Unintended Weight Loss [ ] Food & Nutrition Related Knowledge Deficit [ ] Malnutrition     Nutrition Diagnosis is [ x] ongoing  [ ] resolved [ ] not applicable     New Nutrition Diagnosis: [ x] not applicable       Interventions:   Recommend  [ ] Change Diet To:  [x ] Nutrition Supplement continue ensure max TID  [ ] Nutrition Support  [ x] Other: recommend MVI an Vit C 500mg BID, follow for BM adjust bowel regimen as appropriate , trend weights    Monitoring and Evaluation:   [ x] PO intake [ x ] Tolerance to diet prescription [ x ] weights [ x ] labs[ x ] follow up per protocol  [ ] other:

## 2025-03-19 ENCOUNTER — NON-APPOINTMENT (OUTPATIENT)
Age: 55
End: 2025-03-19

## 2025-03-20 ENCOUNTER — APPOINTMENT (OUTPATIENT)
Dept: BARIATRICS | Facility: CLINIC | Age: 55
End: 2025-03-20
Payer: COMMERCIAL

## 2025-03-20 ENCOUNTER — OUTPATIENT (OUTPATIENT)
Dept: OUTPATIENT SERVICES | Facility: HOSPITAL | Age: 55
LOS: 1 days | End: 2025-03-20
Payer: COMMERCIAL

## 2025-03-20 VITALS
DIASTOLIC BLOOD PRESSURE: 72 MMHG | SYSTOLIC BLOOD PRESSURE: 118 MMHG | HEIGHT: 72 IN | HEART RATE: 78 BPM | BODY MASS INDEX: 32.42 KG/M2 | TEMPERATURE: 97.4 F | WEIGHT: 239.38 LBS | OXYGEN SATURATION: 98 %

## 2025-03-20 DIAGNOSIS — Z98.84 BARIATRIC SURGERY STATUS: ICD-10-CM

## 2025-03-20 DIAGNOSIS — R63.4 ABNORMAL WEIGHT LOSS: ICD-10-CM

## 2025-03-20 DIAGNOSIS — Z98.89 OTHER SPECIFIED POSTPROCEDURAL STATES: Chronic | ICD-10-CM

## 2025-03-20 DIAGNOSIS — E66.811 OBESITY, CLASS 1: ICD-10-CM

## 2025-03-20 DIAGNOSIS — S62.509A FRACTURE OF UNSPECIFIED PHALANX OF UNSPECIFIED THUMB, INITIAL ENCOUNTER FOR CLOSED FRACTURE: Chronic | ICD-10-CM

## 2025-03-20 DIAGNOSIS — K08.409 PARTIAL LOSS OF TEETH, UNSPECIFIED CAUSE, UNSPECIFIED CLASS: Chronic | ICD-10-CM

## 2025-03-20 PROCEDURE — 74220 X-RAY XM ESOPHAGUS 1CNTRST: CPT

## 2025-03-20 PROCEDURE — 74220 X-RAY XM ESOPHAGUS 1CNTRST: CPT | Mod: 26

## 2025-03-20 PROCEDURE — 99204 OFFICE O/P NEW MOD 45 MIN: CPT

## 2025-03-20 RX ORDER — ASPIRIN 81 MG/1
81 TABLET, DELAYED RELEASE ORAL DAILY
Refills: 0 | Status: ACTIVE | COMMUNITY

## 2025-03-20 RX ORDER — ROSUVASTATIN CALCIUM 5 MG/1
5 TABLET, FILM COATED ORAL DAILY
Refills: 0 | Status: ACTIVE | COMMUNITY